# Patient Record
(demographics unavailable — no encounter records)

---

## 2024-12-10 NOTE — HISTORY OF PRESENT ILLNESS
[FreeTextEntry1] : Follow-up for prostate cancer, patient is back for Eligard injection, PSA was undetectable in Aug 2024 Follow-up for microscopic hematuria, had negative urine cytology in July 2024. Asymptomatic  Please refer to URO Consult Note.

## 2024-12-10 NOTE — ADDENDUM
[FreeTextEntry1] : Entered by Hema Leroy, acting as scribe for Dr. Spencer Napier. The documentation recorded by the scribe accurately reflects the service I personally performed and the decisions made by me.

## 2024-12-10 NOTE — LETTER BODY
[FreeTextEntry1] : Kimberley Patel MD  [address] [place] [address]  [phone]    Dear Dr. Patel,    REASON FOR VISIT: Prostate cancer. Trace hematuria.    This is a 91 year old gentleman with prostate cancer on LHRH injections and trace hematuria.  Patient is on Erleade with medical oncology.  the patient returns for Eligard injection. Since he was last seen, his urinalysis demonstrated evidence of trace blood in his urine. He denies any interval complaints or difficulties. Patient reports no pain. He has been doing well. Patient denies any changes in health. Patient reports taking Erleada. The past medical history and family history and social history are unchanged. All other review of systems are negative. Patient denies any changes in medications. Medication list was reconciled.    He is currently taking calcium supplements and vitamin D to prevent osteoporosis.    On examination, the patient is an elderly-appearing gentleman in no acute distress. He is alert and oriented follows commands. He has normal mood and affect. He is normocephalic. Oral no thrush. Neck is supple. Respirations are unlabored. His abdomen is soft and nontender. Liver is nonpalpable. Bladder is nonpalpable. No CVA tenderness. Neurologically he is grossly intact. No peripheral edema. Skin without gross abnormality.   His PSA was undetectable in August 2024. His urinalysis was unremarkable. His Urine cytology was negative.  The patient's right lower abdomen was cleaned with alcohol, 45 mg Eligard was applied subcutaneously. Patient tolerated procedure well.       Assessment: Advanced prostate cancer on the androgen ablation. Trace hematuria.   I counseled the patient. I encourage patient to continue with Eligard as LHRH agonist will help suppress his prostate cancer. He will obtain PSA and testosterone to monitor efficacy of treatment. In terms of his hematuria, his previous urine cytology was negative for high grade urothelial carcinoma. He is also asymptomatic currently. I reassured the patient.  I encouraged him follow-up medical oncology and continue Erleade. Risks and alternatives were discussed. I answered the patient questions. The patient will follow-up as directed and will contact me with any questions or concerns. He will return in six months time for Eligard injection. Thank you for the opportunity to participate in the care of this patient. I'll keep you updated on his progress.    Plan: PSA. Testosterone. Eligard in 6 months.   I spent 30-minutes time today on all issues related to this patient on today date of service including non face to face time.

## 2025-06-13 NOTE — LETTER BODY
[FreeTextEntry1] : Kimberley Patel MD [address] [place] [address] [phone]   Dear Dr. Patel,  REASON FOR VISIT: Prostate cancer. Trace hematuria. Lower back pain.   This is a 92 year old gentleman with prostate cancer on LHRH injections and trace hematuria. Patient is on Erleada with medical oncology. He has been on Eligard hormone therapy since 2019. Patient returns today for follow-up. Since he was last seen, patient reports doing well since his last visit. His PSA was 0.01 in April 2025. With regards to his previous hematuria, he had a negative CT in January 2025. Urine cytology showed atypical urothelial cells. Patient denies any changes in health. Patient reports taking Erleada. He is doing well on Eligard hormone therapy. The past medical history and family history and social history are unchanged. All other review of systems are negative. Patient denies any changes in medications. Medication list was reconciled. Patient is also now on Erleade     He is currently taking calcium supplements and vitamin D to prevent osteoporosis.    On examination, the patient is an elderly-appearing gentleman in no acute distress. He is alert and oriented follows commands. He has normal mood and affect. He is normocephalic. Oral no thrush. Neck is supple. Respirations are unlabored. His abdomen is soft and nontender. Liver is nonpalpable. Bladder is nonpalpable. No CVA tenderness. Neurologically he is grossly intact. No peripheral edema. Skin without gross abnormality.  His in April 2025 PSA was 0.014, which is within normal limits. His urinalysis showed atypical urothelial cells. His urine culture was negative.  The patient's right lower abdomen was cleaned with alcohol, 45 mg Eligard was applied subcutaneously. Patient tolerated procedure well.   Assessment: Advanced prostate cancer on the androgen ablation. Trace hematuria. Lower back pain.   I counseled the patient. In terms of his advanced prostate cancer, his PSA was undetectable in April 2025, 0.01. He tolerated the Eligard injection well today. I reassured the patient. He will obtain PSA and testosterone to ensure stability. I encouraged him follow-up medical oncology and continue taking Erleada. In terms of his previous hematuria, I recommended the patient repeat urinalysis and urine cytology to evaluate for infections and high grade urothelial carcinoma. Risks and alternatives were discussed. I answered the patient questions. The patient will follow-up as directed and will contact me with any questions or concerns. He will return for Eligard injection. Thank you for the opportunity to participate in the care of this patient. I'll keep you updated on his progress.   Plan: PSA. Tesosterone. Urinalysis. Urine cytology. Continue follow up with medical oncology with Christopher.. Follow-up as directed for Eligard injection.  I spent 30-minutes time today on all issues related to this patient on today date of service including non face to face time.

## 2025-06-13 NOTE — HISTORY OF PRESENT ILLNESS
[FreeTextEntry1] : F/U for prostate cancer, patient is on Eligard inj since 2019, also on Erleada 60mg 4 tabs per day. Doing well since last visit, PSA 0.01 in April 2025 F/U for micro hematuria, negative CT in Jan 2025, urine cytology shows atypical urothelial cells   Please refer to URO Consult note

## 2025-06-13 NOTE — ADDENDUM
[FreeTextEntry1] :  Entered by Telma Vallejo, acting as scribe for Dr Spencer Napier. The documentation recorded by the scribe accurately reflects the service I personally performed and the decisions made by me.

## 2025-06-13 NOTE — LETTER BODY
[FreeTextEntry1] : Kimberley Patel MD [address] [place] [address] [phone]   Dear Dr. Patel,  REASON FOR VISIT: Prostate cancer. Trace hematuria. Lower back pain.   This is a 92 year old gentleman with prostate cancer on LHRH injections and trace hematuria. Patient is on Erleada with medical oncology. He has been on Eligard hormone therapy since 2019. Patient returns today for follow-up. Since he was last seen, patient reports doing well since his last visit. His PSA was 0.01 in April 2025. With regards to his previous hematuria, he had a negative CT in January 2025. Urine cytology showed atypical urothelial cells. Patient denies any changes in health. Patient reports taking Erleada. He is doing well on Eligard hormone therapy. The past medical history and family history and social history are unchanged. All other review of systems are negative. Patient denies any changes in medications. Medication list was reconciled. Patient is also now on Erleade     He is currently taking calcium supplements and vitamin D to prevent osteoporosis.    On examination, the patient is an elderly-appearing gentleman in no acute distress. He is alert and oriented follows commands. He has normal mood and affect. He is normocephalic. Oral no thrush. Neck is supple. Respirations are unlabored. His abdomen is soft and nontender. Liver is nonpalpable. Bladder is nonpalpable. No CVA tenderness. Neurologically he is grossly intact. No peripheral edema. Skin without gross abnormality.  His in April 2025 PSA was 0.014, which is within normal limits. His urinalysis showed atypical urothelial cells. His urine culture was negative.  The patient's right lower abdomen was cleaned with alcohol, 45 mg Eligard was applied subcutaneously. Patient tolerated procedure well.   Assessment: Advanced prostate cancer on the androgen ablation. Trace hematuria. Lower back pain.   I counseled the patient. In terms of his advanced prostate cancer, his PSA was undetectable in April 2025, 0.01. He tolerated the Eligard injection well today. I reassured the patient. He will obtain PSA and testosterone to ensure stability. I encouraged him follow-up medical oncology and continue taking Erleada. In terms of his previous hematuria, I recommended the patient repeat urinalysis and urine cytology to evaluate for infections and high grade urothelial carcinoma. Risks and alternatives were discussed. I answered the patient questions. The patient will follow-up as directed and will contact me with any questions or concerns. He will return for Eligard injection. Thank you for the opportunity to participate in the care of this patient. I'll keep you updated on his progress.   Plan: PSA. Tesosterone. Urinalysis. Urine cytology. Continue follow up with medical oncology with Christopher.. Follow-up as directed for Eligard injection.  I spent 30-minutes time today on all issues related to this patient on today date of service including non face to face time.